# Patient Record
Sex: FEMALE | Race: WHITE | Employment: OTHER
[De-identification: names, ages, dates, MRNs, and addresses within clinical notes are randomized per-mention and may not be internally consistent; named-entity substitution may affect disease eponyms.]

---

## 2017-10-08 ENCOUNTER — HEALTH MAINTENANCE LETTER (OUTPATIENT)
Age: 32
End: 2017-10-08

## 2020-05-27 ENCOUNTER — TELEPHONE (OUTPATIENT)
Dept: OBGYN | Facility: CLINIC | Age: 35
End: 2020-05-27

## 2020-05-27 NOTE — TELEPHONE ENCOUNTER
Unable to reach patient via phone. Left message to call clinic back at [phone] and ask for Women's [facility].    Celi Belle RN

## 2020-05-27 NOTE — TELEPHONE ENCOUNTER
"Spoke with pt.  She recently moved to [place] from [place] in March of 2020.  While she was in [place] she was seeing an OB/GYN for her constant right sided stabbing pelvic pain inbetween cycles and her painful heavy periods.  In [place], she did have an US and there were no ovarian cysts or any other cause of her pain, per pt.  She states she does have a family history of endometriosis and they were going to be doing \"labs and other tests to determine if she has endometriosis\".  Pt moved to [place] before anything further could be done and then Covid-19 happened so she hasn't inquired about it yet.  However, pt feels it is time she speak with an OB/GYN about this here in [place].   About 2 weeks ago, her cycle was heavier than it has been.  She states it is usually pretty heavy with large clots but this time it seemed worse.  She states she felt like she was \"pushing out clots\".  She states her cycles typically last 7-9 days with the first 5 days being the heaviest where she is changing pads a couple times an hour.  Pt is currently not bleeding.  She denies any light headedness, dizziness or fatigue.    Pt would like to schedule an appt with Dr. Arguello as she is the only OB/GYN provider in her network.    Dr. Arguello does have telephone visits available next Thursday, 6/4, or a clinic visit available the following Thursday, 6/11.  I will route to Dr. Arguello to inquire if she prefers to see this pt via phone or clinic visit.    I did let pt know that Dr. Arguello is not in clinic today so we will not get a response from her today.  Pt is okay with this.  Pt states it is okay to leave a detailed message on her answering machine if needed.  I will also request ask pt to obtain her US report and other pertinent medical records from [place].    Celi Belle RN    "

## 2020-05-27 NOTE — TELEPHONE ENCOUNTER
[facility] Call Center    Phone Message    May a detailed message be left on voicemail: yes     Reason for Call: Other: Patient states that she was working with a different clinic before she moved back to the area. She had an ultrasound for cysts. They were in the process of testing for Endometriosis. She would like to see a provider at the  location and requested [facility]. She is experiencing heavy bleeding, cramps, and very large clotting symptoms. Please advise.      Last name is different, can be updated once in clinic appt happens.     Action Taken: Message routed to:  [facility] p [id number]

## 2020-05-27 NOTE — TELEPHONE ENCOUNTER
Patient can start with a virtual visit with me.  We should try to get records if we haven't already.

## 2020-05-28 NOTE — TELEPHONE ENCOUNTER
Spoke with pt.  Scheduled her for a telephone visit with Dr. Arguello on 6/4/2020.  Requested pt contact her OB/GYN in [place] to have her records faxed to us.  She states she do that and have them faxed to the  clinic fax number provided.    Celi Belle RN

## 2020-06-04 ENCOUNTER — VIRTUAL VISIT (OUTPATIENT)
Dept: OBGYN | Facility: CLINIC | Age: 35
End: 2020-06-04
Payer: OTHER GOVERNMENT

## 2020-06-04 DIAGNOSIS — R10.2 PELVIC PAIN IN FEMALE: ICD-10-CM

## 2020-06-04 DIAGNOSIS — N93.9 ABNORMAL UTERINE BLEEDING (AUB): Primary | ICD-10-CM

## 2020-06-04 PROBLEM — F41.9 ANXIETY AND DEPRESSION: Status: ACTIVE | Noted: 2020-06-04

## 2020-06-04 PROBLEM — F32.A ANXIETY AND DEPRESSION: Status: ACTIVE | Noted: 2020-06-04

## 2020-06-04 PROCEDURE — 99203 OFFICE O/P NEW LOW 30 MIN: CPT | Mod: 95 | Performed by: OBSTETRICS & GYNECOLOGY

## 2020-06-04 RX ORDER — SERTRALINE HYDROCHLORIDE 100 MG/1
100 TABLET, FILM COATED ORAL DAILY
COMMUNITY

## 2020-06-04 NOTE — PROGRESS NOTES
"Padmini Palmer is a 35 year old female who is being evaluated via a billable telephone visit.      The patient has been notified of following:     \"This telephone visit will be conducted via a call between you and your physician/provider. We have found that certain health care needs can be provided without the need for a physical exam.  This service lets us provide the care you need with a short phone conversation.  If a prescription is necessary we can send it directly to your pharmacy.  If lab work is needed we can place an order for that and you can then stop by our lab to have the test done at a later time.    Telephone visits are billed at different rates depending on your insurance coverage. During this emergency period, for some insurers they may be billed the same as an in-person visit.  Please reach out to your insurance provider with any questions.    If during the course of the call the physician/provider feels a telephone visit is not appropriate, you will not be charged for this service.\"    Patient has given verbal consent for Telephone visit?  Yes    What phone number would you like to be contacted at? [phone]    How would you like to obtain your AVS? Patient Declined    OB/GYN New       NAME:  Padmini Palmer  PCP:  Janice Faulkner  MRN:  [id number]      Impression / Plan     35 year old  with:      ICD-10-CM    1. Abnormal uterine bleeding (AUB)  N93.9 Hemoglobin     Ferritin     US Pelvic Complete w Transvaginal     TSH with free T4 reflex     Prolactin   2. Pelvic pain in female  R10.2        Discussed possible etiologies for bleeding and pain, including endometriosis, adenomyosis, fibroids, polyp, etc.    Follow up in the office for exam and to discuss results and management options. She will establish care with family medicine for anxiety/depression.      Chief Complaint     Chief Complaint   Patient presents with     Pelvic Pain     Abnormal Uterine Bleeding       HPI "     Padmini Palmer is a 35 year old female who is seen for pelvic pain and heavy periods. She has been seeing someone in [place].  Records are not available to me at this time.  She is still working on getting those records.     She has suspected endometriosis for years.  Symptoms have worsened over the last couple of years.      Patient's last menstrual period was 2020.   Random spotting and pelvic pain between periods.    Periods are heavy, clots are getting worse, last 7-9 days.  The last couple of days are light.  When it is the heaviest, she uses both pads and tampons.  Usually goes through the tampon after about an hour, but not always onto the pad.    Cycles are every 26-28 days.    Cramping has been getting worse since the birth of her children 5 years ago.   She uses aleve when it is bad, and that helps.  She is sensitive to medications, gets nauseated really easily.    Cramping starts when the bleeding starts, generally not before.   Acne prior to the onset of her periods.    Pelvic pain stops when when her period starts then starts up again.  Pain is constant.  Some days are worse than others.  She does not think it is related to ovulation.  Sharp, on her right side.  Very distinct. Under her hip bone.      Patient had an US a couple of years ago and that was normal.  She feels like her physician did not take her pain seriously and she wanted to address other things first, like iron levels.     No hormonal contraception since her tubal that was done with her last .      Her mother had ablation and hysterectomy and thinks she is going the same thing.      Mammogram done 6 months ago for right breast pain and lump.  Mammogram and US were normal, no masses, but patient continues to have symptoms.     Just moved back to [place] in March.    Feels like her zoloft needs to be increased.      Date of Last Pap Smear: 2017 or 2018, in [place].    Lab Results   Component Value Date    PAP NIL  2009     Previous abnormal pap smear: No      Problem List     Patient Active Problem List    Diagnosis Date Noted     CARDIOVASCULAR SCREENING; LDL GOAL LESS THAN 160 10/31/2010     Priority: Medium       Medications     Current Outpatient Medications   Medication     IBUPROFEN 200 200 MG OR TABS     sertraline (ZOLOFT) 100 MG tablet     No current facility-administered medications for this visit.         Allergies     No Known Allergies    Past Medical/Surgical History     History reviewed. No pertinent past medical history.    Past Surgical History:   Procedure Laterality Date      SECTION        SECTION, TUBAL LIGATION, COMBINED       CYSTOSCOPY,DIL URETHRAL STRICTURE          Social History     Social History     Socioeconomic History     Marital status:      Spouse name: Not on file     Number of children: Not on file     Years of education: Not on file     Highest education level: Not on file   Occupational History     Not on file   Social Needs     Financial resource strain: Not on file     Food insecurity     Worry: Not on file     Inability: Not on file     Transportation needs     Medical: Not on file     Non-medical: Not on file   Tobacco Use     Smoking status: Never Smoker     Smokeless tobacco: Never Used   Substance and Sexual Activity     Alcohol use: Yes     Comment: every once in awhile     Drug use: No     Sexual activity: Yes     Partners: Male     Birth control/protection: Female Surgical     Comment: Tubal   Lifestyle     Physical activity     Days per week: Not on file     Minutes per session: Not on file     Stress: Not on file   Relationships     Social connections     Talks on phone: Not on file     Gets together: Not on file     Attends Taoism service: Not on file     Active member of club or organization: Not on file     Attends meetings of clubs or organizations: Not on file     Relationship status: Not on file     Intimate partner violence     Fear  of current or ex partner: Not on file     Emotionally abused: Not on file     Physically abused: Not on file     Forced sexual activity: Not on file   Other Topics Concern     Parent/sibling w/ CABG, MI or angioplasty before 65F 55M? No      Service No     Blood Transfusions No     Caffeine Concern No     Occupational Exposure No     Hobby Hazards No     Sleep Concern No     Stress Concern No     Weight Concern No     Special Diet No     Back Care Yes     Comment: from a chiro in the past     Exercise Yes     Bike Helmet Yes     Comment: pt rides bike sometimes but does not wear helmet     Seat Belt Yes     Self-Exams Yes   Social History Narrative     Not on file       Family History      Family History   Problem Relation Age of Onset     Cancer Mother         skin       ROS     A 10 organ review of systems was asked and the pertinent positives and negatives are listed in the HPI. All other organ systems can be considered negative.     Physical Exam   Vitals: LMP 06/04/2020     General: Comfortable, no obvious distress, normal speech   Resp:   breathing is non labored       Labs/Imaging       No recent labs or US in our system.  Has not had US in a couple of years.       30 minutes was spent with patient, more than 50% counseling and coordinating care, as noted above in the A/P    Nursing notes read and reviewed    Merari Arguello MD         Phone call duration: 30 minutes

## 2020-06-05 ENCOUNTER — TELEPHONE (OUTPATIENT)
Dept: OBGYN | Facility: CLINIC | Age: 35
End: 2020-06-05

## 2020-06-05 NOTE — TELEPHONE ENCOUNTER
Health Call Center    Phone Message    May a detailed message be left on voicemail: yes     Reason for Call: Other: Pt states she is due for a physical and not able to get in for this right now with her PCP. Pt is wondering if Dr. 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 Pt states she could bring forms with to next office visit. Please advise.

## 2020-06-05 NOTE — TELEPHONE ENCOUNTER
Returned call to patient left detailed message stating that she could drop forms off early next week for review and that the provider will be in clinic Thursday and Friday of next week and could complete and then patient could be notified when to pick-up.    Left contact number to return call if she had any further questions or concerns.    Swathi Nguyễn, CMA